# Patient Record
Sex: MALE | Employment: OTHER | URBAN - METROPOLITAN AREA
[De-identification: names, ages, dates, MRNs, and addresses within clinical notes are randomized per-mention and may not be internally consistent; named-entity substitution may affect disease eponyms.]

---

## 2023-06-13 DIAGNOSIS — N40.0 BENIGN PROSTATIC HYPERPLASIA, UNSPECIFIED WHETHER LOWER URINARY TRACT SYMPTOMS PRESENT: Primary | ICD-10-CM

## 2023-06-13 DIAGNOSIS — G47.00 INSOMNIA, UNSPECIFIED TYPE: ICD-10-CM

## 2023-06-13 DIAGNOSIS — N52.9 ERECTILE DYSFUNCTION, UNSPECIFIED ERECTILE DYSFUNCTION TYPE: ICD-10-CM

## 2023-06-13 RX ORDER — TRAZODONE HYDROCHLORIDE 100 MG/1
100 TABLET ORAL NIGHTLY
COMMUNITY
Start: 2022-10-31 | End: 2023-06-13 | Stop reason: SDUPTHER

## 2023-06-13 RX ORDER — TAMSULOSIN HYDROCHLORIDE 0.4 MG/1
1 CAPSULE ORAL DAILY
COMMUNITY
Start: 2022-10-31 | End: 2023-06-13 | Stop reason: SDUPTHER

## 2023-06-13 RX ORDER — SILDENAFIL 100 MG/1
100 TABLET, FILM COATED ORAL AS NEEDED
Qty: 10 TABLET | Refills: 0 | Status: SHIPPED | OUTPATIENT
Start: 2023-06-13

## 2023-06-13 RX ORDER — TRAZODONE HYDROCHLORIDE 100 MG/1
100 TABLET ORAL NIGHTLY
Qty: 30 TABLET | Refills: 0 | Status: SHIPPED | OUTPATIENT
Start: 2023-06-13

## 2023-06-13 RX ORDER — TAMSULOSIN HYDROCHLORIDE 0.4 MG/1
0.4 CAPSULE ORAL DAILY
Qty: 30 CAPSULE | Refills: 0 | Status: SHIPPED | OUTPATIENT
Start: 2023-06-13

## 2023-06-13 RX ORDER — SILDENAFIL 100 MG/1
TABLET, FILM COATED ORAL
COMMUNITY
Start: 2022-10-31 | End: 2023-06-13 | Stop reason: SDUPTHER

## 2023-07-20 LAB
AMPHETAMINE (PRESENCE) IN URINE BY SCREEN METHOD: ABNORMAL
BARBITURATES PRESENCE IN URINE BY SCREEN METHOD: ABNORMAL
BENZODIAZEPINE (PRESENCE) IN URINE BY SCREEN METHOD: ABNORMAL
CANNABINOIDS IN URINE BY SCREEN METHOD: ABNORMAL
COCAINE (PRESENCE) IN URINE BY SCREEN METHOD: ABNORMAL
DRUG SCREEN COMMENT URINE: ABNORMAL
FENTANYL URINE: ABNORMAL
METHADONE (PRESENCE) IN URINE BY SCREEN METHOD: ABNORMAL
OPIATES (PRESENCE) IN URINE BY SCREEN METHOD: ABNORMAL
OXYCODONE (PRESENCE) IN URINE BY SCREEN METHOD: ABNORMAL
PHENCYCLIDINE (PRESENCE) IN URINE BY SCREEN METHOD: ABNORMAL

## 2023-07-25 LAB
6-ACETYLMORPHINE: <25 NG/ML
CODEINE: <50 NG/ML
HYDROCODONE: 129 NG/ML
HYDROMORPHONE: 118 NG/ML
MORPHINE URINE: <50 NG/ML
NORHYDROCODONE: 349 NG/ML
NOROXYCODONE: <25 NG/ML
OXYCODONE: <25 NG/ML
OXYMORPHONE: <25 NG/ML

## 2024-06-14 DIAGNOSIS — N40.0 BENIGN PROSTATIC HYPERPLASIA, UNSPECIFIED WHETHER LOWER URINARY TRACT SYMPTOMS PRESENT: ICD-10-CM

## 2024-06-14 DIAGNOSIS — N52.9 ERECTILE DYSFUNCTION, UNSPECIFIED ERECTILE DYSFUNCTION TYPE: ICD-10-CM

## 2024-06-14 DIAGNOSIS — G47.00 INSOMNIA, UNSPECIFIED TYPE: ICD-10-CM

## 2024-06-14 RX ORDER — TAMSULOSIN HYDROCHLORIDE 0.4 MG/1
0.4 CAPSULE ORAL DAILY
Qty: 90 CAPSULE | Refills: 1 | Status: SHIPPED | OUTPATIENT
Start: 2024-06-14 | End: 2024-12-11

## 2024-06-14 RX ORDER — TRAZODONE HYDROCHLORIDE 100 MG/1
100 TABLET ORAL NIGHTLY
Qty: 90 TABLET | Refills: 1 | Status: SHIPPED | OUTPATIENT
Start: 2024-06-14 | End: 2024-12-11

## 2024-06-14 RX ORDER — SILDENAFIL 100 MG/1
100 TABLET, FILM COATED ORAL AS NEEDED
Qty: 30 TABLET | Refills: 0 | Status: SHIPPED | OUTPATIENT
Start: 2024-06-14

## 2024-06-18 ENCOUNTER — APPOINTMENT (OUTPATIENT)
Dept: PRIMARY CARE | Facility: CLINIC | Age: 59
End: 2024-06-18

## 2024-06-18 VITALS
OXYGEN SATURATION: 97 % | SYSTOLIC BLOOD PRESSURE: 128 MMHG | HEART RATE: 78 BPM | WEIGHT: 188 LBS | DIASTOLIC BLOOD PRESSURE: 80 MMHG | BODY MASS INDEX: 23.38 KG/M2 | HEIGHT: 75 IN

## 2024-06-18 DIAGNOSIS — M25.572 LEFT ANKLE PAIN, UNSPECIFIED CHRONICITY: ICD-10-CM

## 2024-06-18 DIAGNOSIS — R10.31 RIGHT INGUINAL PAIN: ICD-10-CM

## 2024-06-18 DIAGNOSIS — M54.9 CHRONIC BACK PAIN, UNSPECIFIED BACK LOCATION, UNSPECIFIED BACK PAIN LATERALITY: ICD-10-CM

## 2024-06-18 DIAGNOSIS — N52.9 ERECTILE DYSFUNCTION, UNSPECIFIED ERECTILE DYSFUNCTION TYPE: ICD-10-CM

## 2024-06-18 DIAGNOSIS — N40.0 BENIGN PROSTATIC HYPERPLASIA, UNSPECIFIED WHETHER LOWER URINARY TRACT SYMPTOMS PRESENT: ICD-10-CM

## 2024-06-18 DIAGNOSIS — Z00.00 HEALTHCARE MAINTENANCE: Primary | ICD-10-CM

## 2024-06-18 DIAGNOSIS — G89.29 CHRONIC BACK PAIN, UNSPECIFIED BACK LOCATION, UNSPECIFIED BACK PAIN LATERALITY: ICD-10-CM

## 2024-06-18 DIAGNOSIS — G47.00 INSOMNIA, UNSPECIFIED TYPE: ICD-10-CM

## 2024-06-18 PROCEDURE — 99213 OFFICE O/P EST LOW 20 MIN: CPT | Performed by: STUDENT IN AN ORGANIZED HEALTH CARE EDUCATION/TRAINING PROGRAM

## 2024-06-18 PROCEDURE — 1036F TOBACCO NON-USER: CPT | Performed by: STUDENT IN AN ORGANIZED HEALTH CARE EDUCATION/TRAINING PROGRAM

## 2024-06-18 PROCEDURE — 99396 PREV VISIT EST AGE 40-64: CPT | Performed by: STUDENT IN AN ORGANIZED HEALTH CARE EDUCATION/TRAINING PROGRAM

## 2024-06-18 RX ORDER — PREGABALIN 50 MG/1
50 CAPSULE ORAL 2 TIMES DAILY
COMMUNITY
Start: 2024-06-13 | End: 2024-09-11

## 2024-06-18 RX ORDER — SILDENAFIL 100 MG/1
100 TABLET, FILM COATED ORAL AS NEEDED
Qty: 30 TABLET | Refills: 0 | Status: SHIPPED | OUTPATIENT
Start: 2024-06-18

## 2024-06-18 RX ORDER — TAMSULOSIN HYDROCHLORIDE 0.4 MG/1
0.4 CAPSULE ORAL DAILY
Qty: 90 CAPSULE | Refills: 1 | Status: SHIPPED | OUTPATIENT
Start: 2024-06-18 | End: 2024-12-15

## 2024-06-18 RX ORDER — METHYLPHENIDATE HYDROCHLORIDE 10 MG/1
TABLET ORAL
COMMUNITY
Start: 2023-11-20

## 2024-06-18 RX ORDER — OXYCODONE AND ACETAMINOPHEN 5; 325 MG/1; MG/1
1 TABLET ORAL EVERY 8 HOURS PRN
COMMUNITY
Start: 2024-06-13 | End: 2024-07-13

## 2024-06-18 RX ORDER — TRAZODONE HYDROCHLORIDE 100 MG/1
100 TABLET ORAL NIGHTLY
Qty: 90 TABLET | Refills: 1 | Status: CANCELLED | OUTPATIENT
Start: 2024-06-18 | End: 2024-12-15

## 2024-06-18 ASSESSMENT — PATIENT HEALTH QUESTIONNAIRE - PHQ9
1. LITTLE INTEREST OR PLEASURE IN DOING THINGS: NOT AT ALL
2. FEELING DOWN, DEPRESSED OR HOPELESS: NOT AT ALL
SUM OF ALL RESPONSES TO PHQ9 QUESTIONS 1 AND 2: 0

## 2024-06-18 NOTE — PROGRESS NOTES
"Subjective   Patient ID: Aashish Carpenter"Obi\" is a 59 y.o. male who presents for Annual Exam.  Today he is accompanied by alone.     HPI  1.  Healthcare maintenance  Overall patient is doing well.   Immunization: Tdap 2005  Declines influenza vaccines yearly  Shingrix declines  Colon Cancer Screening: No family history, FIT testing done 2019  Diet: Attempts eat a well-balanced diet  Exercise: Attempts to exercise regularly  Tobacco: Denies use  EtOH: dnies    2.  BPH/weakened urinary stream  Has a history of kidney stones as well as a weakened urinary stream  Wishes to restart tamsulosin as indicated in the chart  Requesting refills as he has noted his signs/symptoms worsening    3.  Erectile dysfunction  Has a history of erectile dysfunction  Has been using Viagra on an as-needed basis  Refills were sent just prior to today's appointment  Feels well without any issues/complaints    4.  Chronic lower back pain/cervical radiculopathy/long-term use of opioids  Continues to follow-up with pain management and continues opioids as indicated in the chart  Also has been using pregabalin without any major issues/complaints  Did note that he has been having possible side effects due to the change of being on Percocet instead of Ultram/tramadol  Does notice some minor dizziness but is not fully sure if this could be from the medication or not  Follows up appropriately as indicated the chart  Wishes update myself today    5.  ADHD  Continues to follow-up with psychiatry for his ADHD  Uses stimulant medication as noted on his [facility] report  Denies any side effects of this medication and feels well    6.  Left ankle pain  Noted an inversion injury  Still notes some lateral foot pain that is slowly healing  Wish to discuss this briefly at today's visit    7.  Right groin discomfort  Notices some pain within his right groin  Wonders if this could even be due to a minor hernia or something similar  Wishes to discuss this    Current " "Outpatient Medications on File Prior to Visit   Medication Sig Dispense Refill    methylphenidate (Ritalin) 10 mg tablet TAKE 2 TABLETS BY MOUTH DAILY IN THE MORNING AND TAKE 1 TABLET DAILY IN THE AFTERNOON      oxyCODONE-acetaminophen (Percocet) 5-325 mg tablet Take 1 tablet by mouth every 8 hours if needed.      pregabalin (Lyrica) 50 mg capsule Take 1 capsule (50 mg) by mouth twice a day.      traZODone (Desyrel) 100 mg tablet Take 1 tablet (100 mg) by mouth once daily at bedtime. 90 tablet 1    [DISCONTINUED] sildenafil (Viagra) 100 mg tablet Take 1 tablet (100 mg) by mouth if needed for erectile dysfunction. 10 tablet 0    [DISCONTINUED] sildenafil (Viagra) 100 mg tablet Take 1 tablet (100 mg) by mouth if needed for erectile dysfunction. 30 tablet 0    [DISCONTINUED] tamsulosin (Flomax) 0.4 mg 24 hr capsule Take 1 capsule (0.4 mg) by mouth once daily. 30 capsule 0    [DISCONTINUED] tamsulosin (Flomax) 0.4 mg 24 hr capsule Take 1 capsule (0.4 mg) by mouth once daily. 90 capsule 1    [DISCONTINUED] traZODone (Desyrel) 100 mg tablet Take 1 tablet (100 mg) by mouth once daily at bedtime. 30 tablet 0     No current facility-administered medications on file prior to visit.        Allergies   Allergen Reactions    Other Other     ENVIRONMENTAL       Immunization History   Administered Date(s) Administered    Td (adult), unspecified 09/21/2005         Review of Systems  All pertinent positive symptoms are included in the history of present illness.  All other systems have been reviewed and are negative and noncontributory to this patient's current ailments.     Objective   /80 (BP Location: Left arm, Patient Position: Sitting, BP Cuff Size: Adult)   Pulse 78   Ht 1.905 m (6' 3\")   Wt 85.3 kg (188 lb)   SpO2 97%   BMI 23.50 kg/m²   BSA: 2.12 meters squared  No visits with results within 1 Month(s) from this visit.   Latest known visit with results is:   Orders Only on 07/20/2023   Component Date Value Ref " Range Status    DRUG SCREEN COMMENT URINE 07/20/2023 SEE BELOW   Final    Comment: Drug screen results are presumptive and should not be used to assess   compliance with prescribed medication. Definitive confirmatory drug testing   has been added to this sample for any positive screen result and will be   reported separately.   .  Toxicology screening results are reported qualitatively. The concentration   must be greater than or equal to the cutoff to be reported as positive. The   concentration at which the screening test can detect an individual drug or   metabolite varies. The absence of expected drug(s) and/or drug metabolite(s)   may indicate non-compliance, inappropriate timing of specimen collection   relative to drug administration, poor drug absorption, diluted/adulterated   urine, or limitations of testing. For medical purposes only; not valid for   forensic use.   .  Interpretive questions should be directed to the laboratory medical   directors.        Amphetamine Screen, Urine 07/20/2023 PRESUMPTIVE NEGATIVE  NEGATIVE Final    Comment:  CUTOFF LEVEL:  500 NG/ML   Cross-reactivity has been reported with high concentrations   of the following drugs: buproprion, chloroquine, chlorpromazine,   ephedrine, mephentermine, fenfluramine, phentermine,   phenylpropanolamine, pseudoephedrine, and propranolol.      Barbiturate Screen, Urine 07/20/2023 PRESUMPTIVE NEGATIVE  NEGATIVE Final     CUTOFF LEVEL: 200 NG/ML    BENZODIAZEPINE (PRESENCE) IN URINE* 07/20/2023 PRESUMPTIVE NEGATIVE  NEGATIVE Final     CUTOFF LEVEL: 200 NG/ML    Cannabinoid Screen, Urine 07/20/2023 PRESUMPTIVE NEGATIVE  NEGATIVE Final     CUTOFF LEVEL: 50 NG/ML    Cocaine Screen, Urine 07/20/2023 PRESUMPTIVE NEGATIVE  NEGATIVE Final     CUTOFF LEVEL: 150 NG/ML    Fentanyl, Ur 07/20/2023 PRESUMPTIVE NEGATIVE  NEGATIVE Final      CUTOFF LEVEL:  5 NG/ML    Methadone Screen, Urine 07/20/2023 PRESUMPTIVE NEGATIVE  NEGATIVE Final    Comment:  CUTOFF  LEVEL: 150 NG/ML   The metabolite L-alpha-acetylmethadol (LAAM) is not   detected by this method in concentrations that would   be found in the urine of patients on LAAM therapy.      Opiate Screen, Urine 07/20/2023 PRESUMPTIVE POSITIVE (A)  NEGATIVE Final    Comment:  CUTOFF LEVEL: 300 NG/ML   The opiate screen does not detect fentanyl, meperidine, or    tramadol. Oxycodone is not consistently detected (refer to   Oxycodone Screen, Urine result).      Oxycodone Screen, Ur 07/20/2023 PRESUMPTIVE NEGATIVE  NEGATIVE Final    Comment:  CUTOFF LEVEL: 100 NG/ML    This test will accurately detect both oxycodone and oxymorphone.           PCP Screen, Urine 07/20/2023 PRESUMPTIVE NEGATIVE  NEGATIVE Final    Comment:  CUTOFF LEVEL:  25 NG/ML   Cross-reactivity has been reported with dextromethorphan.      6-Acetylmorphine 07/20/2023 <25  Cutoff <25 ng/mL Final    Codeine 07/20/2023 <50  Cutoff <50 ng/mL Final    Hydrocodone 07/20/2023 129 (A)  Cutoff <25 ng/mL Final    Comment: Consistent with metabolism of codeine. May also reflect independent use of a   drug containing hydrocodone. Low concentrations may reflect impurity of a   drug containing oxycodone or hydromorphone.      Hydromorphone 07/20/2023 118 (A)  Cutoff <25 ng/mL Final    Comment: Consistent with metabolism of codeine, morphine, and hydrocodone. May also   reflect independent use of a drug containing hydromorphone. Low   concentrations may reflect impurity of another drug such as oxymorphone.        Morphine Urine 07/20/2023 <50  Cutoff <50 ng/mL Final    Norhydrocodone 07/20/2023 349 (A)  Cutoff <25 ng/mL Final    Comment: Norhydrocodone is a metabolite of hydrocodone; consistent with use of a drug   containing hydrocodone. Hydrocodone may also be a metabolite of codeine or   an impurity of oxycodone.       Noroxycodone 07/20/2023 <25  Cutoff <25 ng/mL Final    Oxycodone 07/20/2023 <25  Cutoff <25 ng/mL Final    Oxymorphone 07/20/2023 <25  Cutoff <25 ng/mL  Final    Comment:  The performance characteristics of the Opiate Confirmation,   Urine has been validated by the individual  laboratory site   where testing is performed. It has not been cleared or approved   by the FDA. However the FDA has determined that such clearance   or approval is not necessary. Our Laboratory is certified under   the Clinical Laboratory Improvement Amendments of 1988 (CLIA)   as qualified to perform high complexity clinical laboratory testing.         Physical Exam  CONSTITUTIONAL - well nourished, well developed, looks like stated age, in no acute distress, not ill-appearing, and not tired appearing  SKIN - normal skin color and pigmentation, normal skin turgor without rash, lesions, or nodules visualized  HEAD - no trauma, normocephalic  EYES - normal external exam  ENT - TM's intact, no injection, no signs of infection, uvula midline, normal tongue movement and throat normal, no exudate, nasal passage without discharge and patent  NECK - supple without rigidity, no neck mass was observed, no thyromegaly or thyroid nodules  CHEST - clear to auscultation, no wheezing, no crackles and no rales, good effort  CARDIAC - regular rate and regular rhythm, no skipped beats, no murmur  ABDOMEN - no organomegaly, soft, nontender, nondistended, normal bowel sounds, no guarding/rebound/rigidity, negative McBurney sign and negative Rodriguez sign  EXTREMITIES - no edema, no deformities, increased laxity/joint movement within the left ankle  NEUROLOGICAL - normal gait, normal balance, normal motor, no ataxia  PSYCHIATRIC - alert, pleasant and cordial, age-appropriate  IMMUNOLOGIC - no cervical lymphadenopathy     Assessment/Plan   1.  Healthcare maintenance  Complete history and physical examination was performed  EKG was not performed today  We will notify of test results once available and make treatment recommendations accordingly  Colon cancer screening discussed in great detail and I did offer the  Real  I understand that you are cash pay so I believe that this is approximately $600 out-of-pocket  Please let me know if you would like to do this as well as your lab work    2.  BPH/weakened urinary stream  We will represcribe tamsulosin as currently noted in the chart  If this worsens we will have him follow-up with urology once again  Refill sent to your pharmacy    3.  Erectile dysfunction  Continue using Viagra on an as-needed basis  Refill sent to your local pharmacy    4.  Chronic lower back pain/cervical radiculopathy/long-term use of opioids  Continue following up with your pain management provider per their protocol    5.  ADHD  Continue following up with his psychiatry provider for further treatment options/recommendations    6.  Insomnia  Continue trazodone on an as-needed basis    7.  Left ankle pain  I truly believe that this is an inversion injury  I recommend exercise and stretching  If this continues, we will discuss an MRI that we can possibly get at a private facility for a cash price    8.  Groin pain  I truly believe that this could be a hernia starting  I would recommend monitoring symptoms and if this worsens we will get you to a general surgeon